# Patient Record
Sex: MALE | Race: WHITE
[De-identification: names, ages, dates, MRNs, and addresses within clinical notes are randomized per-mention and may not be internally consistent; named-entity substitution may affect disease eponyms.]

---

## 2019-08-17 ENCOUNTER — HOSPITAL ENCOUNTER (INPATIENT)
Dept: HOSPITAL 4 - SED | Age: 44
LOS: 2 days | Discharge: HOME | DRG: 310 | End: 2019-08-19
Attending: INTERNAL MEDICINE | Admitting: INTERNAL MEDICINE
Payer: SELF-PAY

## 2019-08-17 VITALS — BODY MASS INDEX: 30.05 KG/M2 | HEIGHT: 66 IN | WEIGHT: 187 LBS | SYSTOLIC BLOOD PRESSURE: 124 MMHG

## 2019-08-17 VITALS — SYSTOLIC BLOOD PRESSURE: 120 MMHG

## 2019-08-17 DIAGNOSIS — I48.91: Primary | ICD-10-CM

## 2019-08-17 DIAGNOSIS — E78.5: ICD-10-CM

## 2019-08-17 DIAGNOSIS — Z82.49: ICD-10-CM

## 2019-08-17 DIAGNOSIS — Z79.82: ICD-10-CM

## 2019-08-17 LAB
ALBUMIN SERPL BCP-MCNC: 3.3 G/DL (ref 3.4–4.8)
ALT SERPL W P-5'-P-CCNC: 27 U/L (ref 12–78)
AMYLASE SERPL-CCNC: 59 U/L (ref 0–100)
ANION GAP SERPL CALCULATED.3IONS-SCNC: 10 MMOL/L (ref 5–15)
AST SERPL W P-5'-P-CCNC: 27 U/L (ref 10–37)
BASOPHILS # BLD AUTO: 0 K/UL (ref 0–0.2)
BASOPHILS NFR BLD AUTO: 0.6 % (ref 0–2)
BILIRUB SERPL-MCNC: 0.5 MG/DL (ref 0–1)
BUN SERPL-MCNC: 26 MG/DL (ref 8–21)
CALCIUM SERPL-MCNC: 8.7 MG/DL (ref 8.4–11)
CHLORIDE SERPL-SCNC: 105 MMOL/L (ref 98–107)
CREAT SERPL-MCNC: 1.16 MG/DL (ref 0.55–1.3)
EOSINOPHIL # BLD AUTO: 0.1 K/UL (ref 0–0.4)
EOSINOPHIL NFR BLD AUTO: 2.5 % (ref 0–4)
ERYTHROCYTE [DISTWIDTH] IN BLOOD BY AUTOMATED COUNT: 13.3 % (ref 9–15)
GFR SERPL CREATININE-BSD FRML MDRD: 87 ML/MIN (ref 90–?)
GLUCOSE SERPL-MCNC: 101 MG/DL (ref 70–99)
HCT VFR BLD AUTO: 46.1 % (ref 36–54)
HGB BLD-MCNC: 15.5 G/DL (ref 14–18)
INR PPP: 1 (ref 0.8–1.2)
LIPASE SERPL-CCNC: 112 U/L (ref 73–393)
LYMPHOCYTES # BLD AUTO: 2.7 K/UL (ref 1–5.5)
LYMPHOCYTES NFR BLD AUTO: 52.1 % (ref 20.5–51.5)
MCH RBC QN AUTO: 29 PG (ref 27–31)
MCHC RBC AUTO-ENTMCNC: 34 % (ref 32–36)
MCV RBC AUTO: 87 FL (ref 79–98)
MONOCYTES # BLD MANUAL: 0.5 K/UL (ref 0–1)
MONOCYTES # BLD MANUAL: 9 % (ref 1.7–9.3)
NEUTROPHILS # BLD AUTO: 1.9 K/UL (ref 1.8–7.7)
NEUTROPHILS NFR BLD AUTO: 35.8 % (ref 40–70)
PLATELET # BLD AUTO: 156 K/UL (ref 130–430)
POTASSIUM SERPL-SCNC: 3.7 MMOL/L (ref 3.5–5.1)
PROTHROMBIN TIME: 9.9 SECS (ref 9.5–12.5)
RBC # BLD AUTO: 5.31 MIL/UL (ref 4.2–6.2)
SODIUM SERPLBLD-SCNC: 139 MMOL/L (ref 136–145)
WBC # BLD AUTO: 5.2 K/UL (ref 4.8–10.8)

## 2019-08-17 PROCEDURE — G0378 HOSPITAL OBSERVATION PER HR: HCPCS

## 2019-08-17 NOTE — NUR
INITIAL NOTE



AT INITIAL ASSESSMENT, PATIENT IS RESTING IN BED, STABLE, NO SIGNS OF RESPIRATORY DISTRESS. 
PATIENT VERBALIZES NO PAIN AT THIS TIME. SIGNIFICANT OTHER IS AT BEDSIDE. PLAN OF CARE FOR 
THE EVENING IS COMMUNICATED WITH THE PATIENT AND HIS PARTNER. PATIENT SUCCESSFULLY 
DEMONSTRATES USAGE OF CALL LIGHT AT THIS TIME. BED IS LOCKED, ALARMED, AND AT THE LOWEST 
LEVEL. FALL AND SAFETY PRECAUTIONS WILL BE TAKEN THROUGHOUT THE SHIFT.

## 2019-08-17 NOTE — NUR
ADMIT NOTE

Received pt from ER to the floor with a diagnosis of new onset a-fib. Admission process 
initiated. patient oriented to pain management, safety and call light-teach back done.

## 2019-08-17 NOTE — NUR
DR. DOMINGUEZ ROUNDS



DR. DOMINGUEZ IS AT BEDSIDE COMMUNICATING WITH PATIENT AT THIS TIME. PATIENT'S PARTNER DAILY 
IS ALSO AT BEDSIDE AT THIS TIME.

## 2019-08-17 NOTE — NUR
Patient will be admitted to care of Dr. Russell.  Admitted to Telemetry unit.  
Will go to room 134.  Belongings list completed.  Summary report printed. 
Report will be given at bedside.

## 2019-08-17 NOTE — NUR
Pt BIB family to ED C/O gradual onset, non-radiating, left-sided chest pain 
associated with dizziness. States that the chest pain is a 5/10. Denies any 
nausea, vomiting, diarrhea, headache, abdominal pain, back pain or any other 
complaints today.

## 2019-08-18 VITALS — SYSTOLIC BLOOD PRESSURE: 94 MMHG

## 2019-08-18 VITALS — SYSTOLIC BLOOD PRESSURE: 121 MMHG

## 2019-08-18 VITALS — SYSTOLIC BLOOD PRESSURE: 102 MMHG

## 2019-08-18 VITALS — SYSTOLIC BLOOD PRESSURE: 120 MMHG

## 2019-08-18 LAB
ALBUMIN SERPL BCP-MCNC: 3.2 G/DL (ref 3.4–4.8)
ALT SERPL W P-5'-P-CCNC: 27 U/L (ref 12–78)
ANION GAP SERPL CALCULATED.3IONS-SCNC: 3 MMOL/L (ref 5–15)
AST SERPL W P-5'-P-CCNC: 27 U/L (ref 10–37)
BASOPHILS # BLD AUTO: 0 K/UL (ref 0–0.2)
BASOPHILS NFR BLD AUTO: 0.6 % (ref 0–2)
BILIRUB SERPL-MCNC: 1 MG/DL (ref 0–1)
BUN SERPL-MCNC: 18 MG/DL (ref 8–21)
CALCIUM SERPL-MCNC: 8.9 MG/DL (ref 8.4–11)
CHLORIDE SERPL-SCNC: 107 MMOL/L (ref 98–107)
CHOLEST SERPL-MCNC: 283 MG/DL (ref ?–200)
CREAT SERPL-MCNC: 1.05 MG/DL (ref 0.55–1.3)
EOSINOPHIL # BLD AUTO: 0.1 K/UL (ref 0–0.4)
EOSINOPHIL NFR BLD AUTO: 2.8 % (ref 0–4)
ERYTHROCYTE [DISTWIDTH] IN BLOOD BY AUTOMATED COUNT: 13.2 % (ref 9–15)
GFR SERPL CREATININE-BSD FRML MDRD: 98 ML/MIN (ref 90–?)
GLUCOSE SERPL-MCNC: 101 MG/DL (ref 70–99)
HCT VFR BLD AUTO: 47.6 % (ref 36–54)
HDLC SERPL-MCNC: 57 MG/DL (ref 45–?)
HGB BLD-MCNC: 15.9 G/DL (ref 14–18)
LDL CHOLESTEROL: 200 MG/DL (ref ?–100)
LYMPHOCYTES # BLD AUTO: 2.7 K/UL (ref 1–5.5)
LYMPHOCYTES NFR BLD AUTO: 60.8 % (ref 20.5–51.5)
MCH RBC QN AUTO: 29 PG (ref 27–31)
MCHC RBC AUTO-ENTMCNC: 33 % (ref 32–36)
MCV RBC AUTO: 87 FL (ref 79–98)
MONOCYTES # BLD MANUAL: 0.4 K/UL (ref 0–1)
MONOCYTES # BLD MANUAL: 9.2 % (ref 1.7–9.3)
NEUTROPHILS # BLD AUTO: 1.2 K/UL (ref 1.8–7.7)
NEUTROPHILS NFR BLD AUTO: 26.6 % (ref 40–70)
PLATELET # BLD AUTO: 148 K/UL (ref 130–430)
POTASSIUM SERPL-SCNC: 3.9 MMOL/L (ref 3.5–5.1)
RBC # BLD AUTO: 5.47 MIL/UL (ref 4.2–6.2)
SODIUM SERPLBLD-SCNC: 138 MMOL/L (ref 136–145)
T4 FREE SERPL-MCNC: 0.8 NG/DL (ref 0.6–1.6)
TRIGL SERPL-MCNC: 101 MG/DL (ref 30–150)
TSH SERPL DL<=0.05 MIU/L-ACNC: 2.91 UIU/ML (ref 0.34–4.82)
WBC # BLD AUTO: 4.4 K/UL (ref 4.8–10.8)

## 2019-08-18 RX ADMIN — AMIODARONE HYDROCHLORIDE SCH MG: 200 TABLET ORAL at 21:13

## 2019-08-18 NOTE — NUR
MEDS ADMIN:

ALL DUE MEDS GIVEN. FAMILIES AT BEDSIDE.SANDWICH /JUICE/SHERBET GIVEN FOR SNACKS.TYLENOL PO 
GIVEN FOR HEADACHE.

## 2019-08-18 NOTE — NUR
Initial Note:

Pt awake, alert and oriented. No acute distress noted. Denies any pain, SOB, but pt feels 
palpitations heart rate is 82BPM at this time. Updated pt on plan of care, consult with 
cardiologist. Safety precautions observed and in place, bed in lowest position, call light 
within reach and encourage pt to use for assistance.

## 2019-08-18 NOTE — NUR
INITIAL NOTES:

PATIENT IN BED ,SITTING, AWAKE ORIENTED X4.VITAL SIGNS STABLE. HAS MILD FRONTAL HEADACHE. 
DENIES CHEST PAIN NOR SOB. CONTROLLED A FIB .CALL LIGHT WITHIN REACH. BED IN LOW POSITION. 
WILL MONITOR CLOSELY.

## 2019-08-18 NOTE — NUR
Notes:

Pt sitting up and eating in bed. Complain of nausea. Medicated with Zofran. No acute 
distress noted. Pt denies any pain, dizziness, SOB, or discomfort. Family at bedside. Will 
continue to monitor.

## 2019-08-18 NOTE — NUR
CONSULTATION PAGED/CALLED

Reason for Consultation: AF

Person Who was Notified: NATE

Consulting Physician: ILIA VAZQUEZ IS ON CALL

Consultant Specialty: CARDIO

Ordering Physician: ALBERTO

## 2019-08-18 NOTE — NUR
Notes:

Pt resting in bed. Seen by Dr. Hernandez. No acute distress noted. Denies any pain, dizziness, 
or discomfort. Family at bedside.

## 2019-08-18 NOTE — NUR
NOTES:

Pt awake, lying down. 2D ECHO being done at bedside. No acute distress noted. Pt denies any 
dizziness, chest pain, nausea, or pain. Will continue to monitor.

## 2019-08-18 NOTE — NUR
NOTES:

Pt is asleep in bed. No acute distress noted. Breathing is even and un-labored. Will 
continue to monitor.

## 2019-08-18 NOTE — NUR
NOTES

In bed, resting. denies any pain or discomfort. controlled Afib on the monitor. all needs 
meet through out shift. no distress noted. will endorse.

## 2019-08-18 NOTE — NUR
CLOSING NOTE



PATIENT SLEPT WELL THROUGHOUT THE SHIFT AND VERBALIZED NO CHEST PAIN. AT THIS TIME, PATIENT 
IS RESTING IN BED, STABLE, NO SIGNS OF RESPIRATORY DISTRESS. CALL LIGHT IS WITHIN REACH. BED 
IS LOCKED, ALARMED, AND AT THE LOWEST LEVEL. FALL, AND SAFETY PRECAUTIONS HAVE BEEN IN PLACE 
THROUGHOUT THE SHIFT. WILL CONTINUE TO MONITOR UNTIL SHIFT REPORT IS GIVEN AT BEDSIDE TO AM 
NURSE.

## 2019-08-18 NOTE — NUR
NOTE



PATIENT IS SLEEPING, STABLE, NO SIGNS OF RESPIRATORY DISTRESS. CALL LIGHT IS WITHIN REACH. 
BED IS LOCKED, ALARMED, AND AT THE LOWEST LEVEL.

## 2019-08-19 VITALS — SYSTOLIC BLOOD PRESSURE: 94 MMHG

## 2019-08-19 VITALS — SYSTOLIC BLOOD PRESSURE: 97 MMHG

## 2019-08-19 VITALS — SYSTOLIC BLOOD PRESSURE: 131 MMHG

## 2019-08-19 VITALS — SYSTOLIC BLOOD PRESSURE: 105 MMHG

## 2019-08-19 VITALS — SYSTOLIC BLOOD PRESSURE: 118 MMHG

## 2019-08-19 RX ADMIN — AMIODARONE HYDROCHLORIDE SCH MG: 200 TABLET ORAL at 06:35

## 2019-08-19 NOTE — NUR
OPENING NOTE:

RECEIVED PATIENT FROM NIGHT SHIFT NURSE. PATIENT IS AWAKE LAYING DOWN IN BED. NO SIGNS OF 
DISTRESS OR SHORTNESS OF BREATH NOTED. PATIENT STATES HE HAS STOMACH PAIN 4/10 BUT IS 
TOLERABLE AT THE MOMENT. IV SITE IS PATENT WITH NO SIGNS OF INFILTRATION. PATIENT IS 
TOLERATING ROOM AIR AT 96%. BED IS LOCKED IN LOWEST POSITION WITH CALL LIGHT IN REACH. 
SAFETY AND FALL PRECAUTIONS ARE IN PLACE. WILL CONTINUE TO MONITOR PATIENT FOR ANY CHANGES. 

-------------------------------------------------------------------------------

Addendum: 08/19/19 at 0956 by Michelle Samson RN

-------------------------------------------------------------------------------

PT IS AAOX4, VERBAL. PT HAS NO C/O DIZZINESS OR CHEST PAIN AT THIS TIME.

## 2019-08-19 NOTE — NUR
ROUNDS

PT IN BED TALKING TO VISITORS AT BEDSIDE, NO S/S OF DISTRESS OR SOB NOTED, PT HAS NO C/O 
PAIN AT THIS TIME, PT IN STABLE CONDITION, WILL CONTINUE TO MONITOR PT FOR ANY CHANGES.

## 2019-08-19 NOTE — NUR
ROUNDS

PT IN BED, NO S/S OF DISTRESS OR SOB NOTED, PT HAS NO C/O PAIN AT THIS TIME, PT IN STABLE 
CONDITION, PT WATCHING TV, WILL CONTINUE TO MONITOR PT FOR ANY CHANGES.

## 2019-08-19 NOTE — NUR
CLOSING:

NO ACUTE CARDIOPULMONARY DISTRESS WHOLE SHIFT. NO H/A THIS AM.SLEPT AT LONG INTERVALS.DENIES 
CHEST PAIN NOR DIZZINESS.  HAS TOLERABLE LOW BACK PAIN THIS AM. WILL ENDORSE CARE TO AM RM .

## 2019-08-19 NOTE — NUR
D/C Patient

Patient given medication reconciliation form and D/C instructions. Exit Care provided. 
Patient verbalized understanding. MD discussed with patient the results and treatment 
provided. Ambulatory with steady gait for discharge to home. Patient in stable condition, ID 
band removed. IV catheter removed, intact and dressing applied, no active bleeding. Rx of 
aspirin and amiodarone given. Patient educated on pain management. All belongings sent with 
patient.